# Patient Record
(demographics unavailable — no encounter records)

---

## 2025-01-24 NOTE — HEALTH RISK ASSESSMENT
[Good] : ~his/her~  mood as  good [Yes] : Yes [2 - 4 times a month (2 pts)] : 2-4 times a month (2 points) [1 or 2 (0 pts)] : 1 or 2 (0 points) [Never (0 pts)] : Never (0 points) [No] : In the past 12 months have you used drugs other than those required for medical reasons? No [PHQ-2 Negative - No further assessment needed] : PHQ-2 Negative - No further assessment needed [Never] : Never [None] : None [With Family] : lives with family [Graduate School] : graduate school [Sexually Active] : sexually active [Feels Safe at Home] : Feels safe at home [Fully functional (bathing, dressing, toileting, transferring, walking, feeding)] : Fully functional (bathing, dressing, toileting, transferring, walking, feeding) [Fully functional (using the telephone, shopping, preparing meals, housekeeping, doing laundry, using] : Fully functional and needs no help or supervision to perform IADLs (using the telephone, shopping, preparing meals, housekeeping, doing laundry, using transportation, managing medications and managing finances) [Reports normal functional visual acuity (ie: able to read med bottle)] : Reports normal functional visual acuity [Smoke Detector] : smoke detector [Carbon Monoxide Detector] : carbon monoxide detector [Seat Belt] :  uses seat belt [Sunscreen] : uses sunscreen [de-identified] : 4x/wk delfina cheng [de-identified] : balanced no restrictions [Change in mental status noted] : No change in mental status noted [Language] : denies difficulty with language [Behavior] : denies difficulty with behavior [Learning/Retaining New Information] : denies difficulty learning/retaining new information [Handling Complex Tasks] : denies difficulty handling complex tasks [Reasoning] : denies difficulty with reasoning [Spatial Ability and Orientation] : denies difficulty with spatial ability and orientation [High Risk Behavior] : no high risk behavior [Reports changes in hearing] : Reports no changes in hearing [Reports changes in vision] : Reports no changes in vision [FreeTextEntry2] :

## 2025-01-24 NOTE — HISTORY OF PRESENT ILLNESS
[FreeTextEntry1] : establish care [de-identified] : 35M presents to establish care. would like to lose weight. active lifestyle delfina baez regularly. ongoing w/u with uro for hypogonadism.

## 2025-01-24 NOTE — HISTORY OF PRESENT ILLNESS
[FreeTextEntry1] : establish care [de-identified] : 35M presents to establish care. would like to lose weight. active lifestyle delfina baez regularly. ongoing w/u with uro for hypogonadism.

## 2025-01-24 NOTE — HEALTH RISK ASSESSMENT
[Good] : ~his/her~  mood as  good [Yes] : Yes [2 - 4 times a month (2 pts)] : 2-4 times a month (2 points) [1 or 2 (0 pts)] : 1 or 2 (0 points) [Never (0 pts)] : Never (0 points) [No] : In the past 12 months have you used drugs other than those required for medical reasons? No [PHQ-2 Negative - No further assessment needed] : PHQ-2 Negative - No further assessment needed [Never] : Never [None] : None [With Family] : lives with family [Graduate School] : graduate school [Sexually Active] : sexually active [Feels Safe at Home] : Feels safe at home [Fully functional (bathing, dressing, toileting, transferring, walking, feeding)] : Fully functional (bathing, dressing, toileting, transferring, walking, feeding) [Fully functional (using the telephone, shopping, preparing meals, housekeeping, doing laundry, using] : Fully functional and needs no help or supervision to perform IADLs (using the telephone, shopping, preparing meals, housekeeping, doing laundry, using transportation, managing medications and managing finances) [Reports normal functional visual acuity (ie: able to read med bottle)] : Reports normal functional visual acuity [Smoke Detector] : smoke detector [Carbon Monoxide Detector] : carbon monoxide detector [Seat Belt] :  uses seat belt [Sunscreen] : uses sunscreen [de-identified] : 4x/wk delfina cheng [de-identified] : balanced no restrictions [Change in mental status noted] : No change in mental status noted [Language] : denies difficulty with language [Behavior] : denies difficulty with behavior [Learning/Retaining New Information] : denies difficulty learning/retaining new information [Handling Complex Tasks] : denies difficulty handling complex tasks [Reasoning] : denies difficulty with reasoning [Spatial Ability and Orientation] : denies difficulty with spatial ability and orientation [High Risk Behavior] : no high risk behavior [Reports changes in hearing] : Reports no changes in hearing [Reports changes in vision] : Reports no changes in vision [FreeTextEntry2] :

## 2025-01-24 NOTE — ASSESSMENT
[FreeTextEntry1] : hypogonad - f/u LH, FSH, testosterone - f/u uro  Obesity - LSM discussed  HCM Educated about importance of healthy diet, physical activity (45 min 5 days a week moderate intensity exercises), proper sleep (8 hours of sleep), importance of screening test for early detection and treatment of cancer. Importance of immunizations including COVID-19 and seasonal flu vaccine in order to prevent or lessen disease.

## 2025-01-28 NOTE — HISTORY OF PRESENT ILLNESS
[FreeTextEntry1] : 01/23/2025 cc new patient vist Pt is a 35 year old male presenting for a new patient visit.   Wife 37F, works as a  at [employer] Attempted IVF X1 (retrieved 2 eggs), doing second round currently Follows with [facility] (Dr. Ac Bowers in the past, planned to see Dr. Ashley Ashford) Neither patient nor partner have children Wife has been checked out - initially she was told "tubes are blocked" but states it was a misdiagnosis Patient had SA X1 a few months ago (does not have it on him) but states he had oligospermia, abnormal morphology Lots of colds, but not recent febrile illnesses. Lots of biking, instances of numbness to penis/scrotum. No issues with erection or ejaculating. No issues with urination. Good libido, good energy. Has never had his T checked. Reports his testicles have always "hung lower" than his friends Him and partner want at least 1 child.  PMH: neck injury (has a "floating bone") PSH: denies Meds: natural sleeping aids FH: aunt - breast cancer SH: occasional EtOH, no MJ use or recreational drug use; works as a

## 2025-01-28 NOTE — ASSESSMENT
[FreeTextEntry1] : Pt is a 35 year old male with male infertility.  Informed pt that blood work and semen analysis must be gathered before continuing with treatment plan.   Complete Semen Analysis ordered today

## 2025-01-28 NOTE — HISTORY OF PRESENT ILLNESS
[FreeTextEntry1] : 01/23/2025 cc new patient vist Pt is a 35 year old male presenting for a new patient visit.   Wife 37F, works as a  at [employer] Attempted IVF X1 (retrieved 2 eggs), doing second round currently Follows with [facility] (Dr. Ac Bowers in the past, planned to see Dr. Ashley Asfhord) Neither patient nor partner have children Wife has been checked out - initially she was told "tubes are blocked" but states it was a misdiagnosis Patient had SA X1 a few months ago (does not have it on him) but states he had oligospermia, abnormal morphology Lots of colds, but not recent febrile illnesses. Lots of biking, instances of numbness to penis/scrotum. No issues with erection or ejaculating. No issues with urination. Good libido, good energy. Has never had his T checked. Reports his testicles have always "hung lower" than his friends Him and partner want at least 1 child.  PMH: neck injury (has a "floating bone") PSH: denies Meds: natural sleeping aids FH: aunt - breast cancer SH: occasional EtOH, no MJ use or recreational drug use; works as a

## 2025-01-28 NOTE — END OF VISIT
[FreeTextEntry4] : This note was written by Mala Gill on 01/23/2025 actively solely Manpreet Quinonez M.D. I, Mala Gill, am scribing for and in the presence of Manpreet Quinonez M.D. in the following sections HISTORY OF PRESENT ILLNESS, PAST MEDICAL/FAMILY/SOCIAL HISTORY; REVIEW OF SYSTEMS; VITAL SIGNS; PHYSICAL EXAM; ASSESSMENT/PLAN. All medical record entries made by this scribe at , Manpreet Quinonez M.D. direction and personally dictated by me on 01/23/2025. I personally performed the services described in the documentation, reviewed the documentation recorded by the scribe in my presence, and it accurately and completely records my words and actions.

## 2025-02-25 NOTE — ASSESSMENT
APNP [facility]ski's and Dr. Grigsby's Team,     GISELE Per chart review Karishma was seen at [facility] ER on 12/16 for a cough with new sputum, chest discomfort. ER started her on  PRN gabapentin. I was unable to reach her telephonically this week.     Please contact Karishma if either team wants to see her for a post ER visit.     Thank you,   PRATIK Jacobs [phone]   [facility]    [FreeTextEntry1] : ADY GUZMAN is a 35-year-old male with right shoulder pain and right knee pain. I discussed with the patient that their symptoms, signs, and imaging are most consistent with right shoulder labral tear and left knee MCL sprain.  We reviewed the natural history of this condition and treatment options ranging from conservative measures (activity modification, physical therapy, icing, oral anti-inflammatory and/or analgesic medications, steroid injection) to surgical management. We agreed on the following plan:    XRs and MRIs reviewed with the patient today. Encouraged to continue home exercises per handout. Continue physical therapy. New referral provided. We had a lengthy discussion about surgical options for shoulder labral tear. Patient is advised to continue conservative management given minimal pain symptoms, but understanding the risks involved with combat sports and shoulder instability. Patient to consider surgery should pain worsen, range of motion limits, or his shoulder becomes recurrently unstable. Follow up as needed.

## 2025-02-25 NOTE — ASSESSMENT
[FreeTextEntry1] : ADY GUZMAN is a 35-year-old male with right shoulder pain and right knee pain. I discussed with the patient that their symptoms, signs, and imaging are most consistent with right shoulder labral tear and left knee MCL sprain.  We reviewed the natural history of this condition and treatment options ranging from conservative measures (activity modification, physical therapy, icing, oral anti-inflammatory and/or analgesic medications, steroid injection) to surgical management. We agreed on the following plan:    XRs and MRIs reviewed with the patient today. Encouraged to continue home exercises per handout. Continue physical therapy. New referral provided. We had a lengthy discussion about surgical options for shoulder labral tear. Patient is advised to continue conservative management given minimal pain symptoms, but understanding the risks involved with combat sports and shoulder instability. Patient to consider surgery should pain worsen, range of motion limits, or his shoulder becomes recurrently unstable. Follow up as needed.

## 2025-02-25 NOTE — HISTORY OF PRESENT ILLNESS
[de-identified] : ADY GUZMAN is a 35-year-old male with right shoulder pain. He comes in for a second opinion. The pain started in November 2024 while wrestling, he injured his neck which subsequently affected his shoulders. He states that when it happened they were "gentle practicing". RHD. He did 6 weeks of PT. The initial shoulder injury occurred in 2011, involving a complete dislocation that self-reduced. Patient wants to know if he needs surgery. The current symptoms include weakness, aching pain, cracking, and clicking in the shoulder. Movements such as rapid pulling, yanking, and reaching behind the back cause discomfort. The patient experiences numbness in the fifth and fourth fingers, which may be positional or related to the neck. No injections have been received in the shoulder or neck. The patient has a high pain threshold and does not experience significant pain during physical activities. The patient is a  and sometimes wrestles competitively.

## 2025-02-25 NOTE — PHYSICAL EXAM
[de-identified] : General: Well-nourished, well-developed, alert, and in no acute distress.  Head: Normocephalic.  Eyes: Pupils equal, extraocular muscles intact, normal sclera.  Nose: No nasal discharge.  Cardiovascular: Extremities are warm and well-perfused. Distal pulses are symmetric bilaterally.  Respiratory: No labored breathing.  Extremities: Sensation is intact distally bilaterally. Distal pulses are symmetric bilaterally  Lymphatic: No regional lymphadenopathy, no lymphedema  Neurologic: No focal deficits  Skin: Normal skin color, texture, and turgor  Psychiatric: Normal affect  MSK: C-spine demonstrates no tenderness to palpation midline, paraspinals. AROM: full, pain with lateral flexion bilaterally Cervical facet loading: negative Spurling's Compression: negative   Examination of right shoulder shows no overlying skin changes or muscular atrophy. There is no tenderness to palpation over the AC joint, Bicipital groove, Trapezius Range of motion shows forward elevation of 140, abduction 140, external rotation 60, and internal rotation to the lumbar spine. There is no pain at the end range of motion.   Special Tests: Neer's negative Hawkin's negative Ashleigh's negative Resisted ext rotation negative Lift off negative Sunflower positive Speed's negative Apprehension test positive No scapular winging.   Examination of left shoulder shows no overlying skin changes or muscular atrophy. There is no tenderness to palpation over the AC joint, Bicipital groove, Trapezius   Range of motion shows forward elevation of 180, abduction 180, external rotation 60, and internal rotation to the mid thoracic spine. There is no pain at the end range of motion.   Special Tests: Neer's negative Hawkin's negative Ashleigh's negative Resisted ext rotation negative Lift off negative Sunflower negative Speed's negative Apprehension test negative No scapular winging.     Sensation is intact to light touch over the axillary, musculocutaneous, median, radial, and ulnar nerve distributions bilaterally. Capillary refill is less than two seconds. Radial pulses 2+ equal bilaterally. Strength testing shows 5/5 abduction, 5/5 external rotation, 5/5 internal rotation, 5/5 biceps, 5/5 triceps. 5/5 wrist extension, 5/5 intrinsics. Reflexes 2+ biceps, brachioradialis.   Examination of [right] knee:   Gait [non-antalgic] No pain with double leg squat  No effusion No erythema, hematoma or skin lesion Nontender to palpation: medial joint line, lateral joint line, medial patellar facet, lateral patellar facet, quad tendon, patellar tendon, pes, Gerdy's tubercle, tibial tuberosity, popliteal fossa, hamstrings, ITB No warmth No Baker's cyst palpable ROM: 0-[120] [No] patellar crepitus   Log roll negative Lachman negative Anterior drawer negative Posterior drawer negative Varus/valgus stress negative at 0 and 30 deg Clayton negative Thessaly negative    [de-identified] :  MRI RIGHT SHOULDER WITHOUT CONTRAST obtained at [facility] on 2024 was reviewed with the patient demonstratin.  Focal low-grade interstitial tear of the anterior supraspinatus footprint. Mild/moderate rotator cuff tendinosis. 2.  Superior, anterior and inferior labral tear with a multilobulated paralabral cyst anteroinferiorly. 3.  Mild acromioclavicular joint arthrosis. Subacromial spur and mild thickening of the coracoacromial ligament. 4.  Mild subacromial/subdeltoid bursitis.  X-RAY RIGHT SHOULDER MINIMUM 2 VIEWS obtained at [facility] on 2024 was reviewed with the patient demonstrating: No fracture or dislocation. Normal glenohumeral joint. Mild acromioclavicular joint arthrosis. Subacromial spur.  Unremarkable soft tissue structures. Visualized right hemithorax is grossly clear.

## 2025-02-25 NOTE — DISCUSSION/SUMMARY

## 2025-02-25 NOTE — HISTORY OF PRESENT ILLNESS
[de-identified] : ADY GUZMAN is a 35-year-old male with right shoulder pain. He comes in for a second opinion. The pain started in November 2024 while wrestling, he injured his neck which subsequently affected his shoulders. He states that when it happened they were "gentle practicing". RHD. He did 6 weeks of PT. The initial shoulder injury occurred in 2011, involving a complete dislocation that self-reduced. Patient wants to know if he needs surgery. The current symptoms include weakness, aching pain, cracking, and clicking in the shoulder. Movements such as rapid pulling, yanking, and reaching behind the back cause discomfort. The patient experiences numbness in the fifth and fourth fingers, which may be positional or related to the neck. No injections have been received in the shoulder or neck. The patient has a high pain threshold and does not experience significant pain during physical activities. The patient is a  and sometimes wrestles competitively.

## 2025-02-25 NOTE — PHYSICAL EXAM
[de-identified] : General: Well-nourished, well-developed, alert, and in no acute distress.  Head: Normocephalic.  Eyes: Pupils equal, extraocular muscles intact, normal sclera.  Nose: No nasal discharge.  Cardiovascular: Extremities are warm and well-perfused. Distal pulses are symmetric bilaterally.  Respiratory: No labored breathing.  Extremities: Sensation is intact distally bilaterally. Distal pulses are symmetric bilaterally  Lymphatic: No regional lymphadenopathy, no lymphedema  Neurologic: No focal deficits  Skin: Normal skin color, texture, and turgor  Psychiatric: Normal affect  MSK: C-spine demonstrates no tenderness to palpation midline, paraspinals. AROM: full, pain with lateral flexion bilaterally Cervical facet loading: negative Spurling's Compression: negative   Examination of right shoulder shows no overlying skin changes or muscular atrophy. There is no tenderness to palpation over the AC joint, Bicipital groove, Trapezius Range of motion shows forward elevation of 140, abduction 140, external rotation 60, and internal rotation to the lumbar spine. There is no pain at the end range of motion.   Special Tests: Neer's negative Hawkin's negative Ashleigh's negative Resisted ext rotation negative Lift off negative Macoupin positive Speed's negative Apprehension test positive No scapular winging.   Examination of left shoulder shows no overlying skin changes or muscular atrophy. There is no tenderness to palpation over the AC joint, Bicipital groove, Trapezius   Range of motion shows forward elevation of 180, abduction 180, external rotation 60, and internal rotation to the mid thoracic spine. There is no pain at the end range of motion.   Special Tests: Neer's negative Hawkin's negative Ashleigh's negative Resisted ext rotation negative Lift off negative Macoupin negative Speed's negative Apprehension test negative No scapular winging.     Sensation is intact to light touch over the axillary, musculocutaneous, median, radial, and ulnar nerve distributions bilaterally. Capillary refill is less than two seconds. Radial pulses 2+ equal bilaterally. Strength testing shows 5/5 abduction, 5/5 external rotation, 5/5 internal rotation, 5/5 biceps, 5/5 triceps. 5/5 wrist extension, 5/5 intrinsics. Reflexes 2+ biceps, brachioradialis.   Examination of [right] knee:   Gait [non-antalgic] No pain with double leg squat  No effusion No erythema, hematoma or skin lesion Nontender to palpation: medial joint line, lateral joint line, medial patellar facet, lateral patellar facet, quad tendon, patellar tendon, pes, Gerdy's tubercle, tibial tuberosity, popliteal fossa, hamstrings, ITB No warmth No Baker's cyst palpable ROM: 0-[120] [No] patellar crepitus   Log roll negative Lachman negative Anterior drawer negative Posterior drawer negative Varus/valgus stress negative at 0 and 30 deg Clayton negative Thessaly negative    [de-identified] :  MRI RIGHT SHOULDER WITHOUT CONTRAST obtained at [facility] on 2024 was reviewed with the patient demonstratin.  Focal low-grade interstitial tear of the anterior supraspinatus footprint. Mild/moderate rotator cuff tendinosis. 2.  Superior, anterior and inferior labral tear with a multilobulated paralabral cyst anteroinferiorly. 3.  Mild acromioclavicular joint arthrosis. Subacromial spur and mild thickening of the coracoacromial ligament. 4.  Mild subacromial/subdeltoid bursitis.  X-RAY RIGHT SHOULDER MINIMUM 2 VIEWS obtained at [facility] on 2024 was reviewed with the patient demonstrating: No fracture or dislocation. Normal glenohumeral joint. Mild acromioclavicular joint arthrosis. Subacromial spur.  Unremarkable soft tissue structures. Visualized right hemithorax is grossly clear.

## 2025-02-25 NOTE — END OF VISIT
[FreeTextEntry3] : Documented by Anastasiya Ray acting as a scribe for Lula Sandhu on 02/21/2025   All medical record entries made by the Scribe were at my, Dr. Lula Sandhu direction and personally dictated by me on 02/21/2025 . I have reviewed the chart and agree that the record accurately reflects my personal performance of the history, physical exam, assessment and plan. I have also personally directed, reviewed, and agreed with the chart. [Time Spent: ___ minutes] : I have spent [unfilled] minutes of time on the encounter which excludes teaching and separately reported services.

## 2025-02-25 NOTE — DISCUSSION/SUMMARY

## 2025-02-25 NOTE — ADDENDUM
[FreeTextEntry1] : I, Anastasiya Ray ([facility]) assisted in filling out this chart under the dictation of Lula Sandhu on 02/21/2025 .

## 2025-02-25 NOTE — ADDENDUM
[FreeTextEntry1] : I, Anastasiya Ray ([facility]) assisted in filling out this chart under the dictation of Lula Sandhu on 02/21/2025 .

## 2025-04-21 NOTE — HISTORY OF PRESENT ILLNESS
[de-identified] : His wife is concerned about his hearing; he's had a few audiograms most recently ~5 yrs ago at which time he was told that he has HFHL. Rare tinnitus. Denies: ear pain, drainage, frequent loud noise exp/shooting, frequent infections, hx of ear surgery or chemo; denies a FHx of hearing loss. Hx of IV Vanc in ~2015 for a staph infxn of his R arm. Qtip use: yes For several years he's had strongly seasonal allergies for which he uses antihistamines or prn Flonase. Nevertheless he has a chronically blocked nose.

## 2025-04-21 NOTE — PROCEDURE
[de-identified] : Indication: requirement for exam not possible via anterior rhinoscopy; chronic nasal obstruction After verbal consent and the administration of an aerosolized oxymetazoline/lidocaine mix, examination was performed with a flexible endoscope attached to a video monitoring system. Findings: Septum: L NSD Mucosa: normal Polyposis: not present Inferior turbinates: large, pos Afrin test Middle and superior turbinates: normal Inferior meatus: unremarkable Middle meatus: unremarkable Superior meatus: unremarkable Speno-ethmoidal recess: unremarkable Nasopharynx: unremarkable Secretions: unremarkable Other findings: none

## 2025-04-21 NOTE — ASSESSMENT
[FreeTextEntry1] : We discussed his hearing loss which may be genetic, a result of his IV abx or a combination of factors; HAE ordered as requested. RTC for an ear cleaning in 6 months; annual audios, sooner prn any changes.  Trial of fluticasone daily for a month & RTC for lack of effect. We discussed allergen mitigation and provided the patient with the corresponding educational handout; reviewed proper nasal steroid administration technique.

## 2025-04-21 NOTE — PHYSICAL EXAM
[Binocular Microscopic Exam] : Binocular microscopic exam was performed [Nasal Endoscopy Performed] : nasal endoscopy was performed, see procedure section for findings [] : septum deviated to the left [Normal] : no masses and lesions seen, face is symmetric [FreeTextEntry8] : obstructing cerumen removed with suction [FreeTextEntry9] : obstructing cerumen removed with suction [de-identified] :  Large inferior turbinates; positive Afrin test

## 2025-07-06 NOTE — PHYSICAL EXAM
[de-identified] : General: No acute distress, conversant, well-nourished. Head: Normocephalic, atraumatic Neck: trachea midline, FROM Heart: normotensive and normal rate and rhythm Lungs: No labored breathing Skin: No abrasions, no rashes, no edema Psych: Alert and oriented to person, place and time Extremities: no peripheral edema or digital cyanosis Gait: Normal gait. Can perform tandem gait.   Vascular: warm and well perfused distally, palpable distal pulses  MSK: Lumbar spine: No tenderness to palpation.  No step-off, no deformity.  NEURO EXAM: Sensation  Left L2  -  2/2             Left L3  -  2/2 Left L4  -  2/2 Left L5  -  2/2 Left S1  -  2/2  Right L2  -  2/2             Right L3  -  2/2 Right L4  -  2/2 Right L5  -  2/2 Right S1  -  2/2  Motor:  Left L2 (hip flexion)                            5/5                 Left L3 (knee extension)                   5/5                 Left L4 (ankle dorsiflexion)                 5/5                 Left L5 (long toe extensor)                5/5                 Left S1 (ankle plantar flexion)           5/5  Right L2 (hip flexion)                            5/5                 Right L3 (knee extension)                   5/5                 Right L4 (ankle dorsiflexion)                 5/5                 Right L5 (long toe extensor)                5/5                 Right S1 (ankle plantar flexion)           5/5  Reflexes: Normal and symmetric Negative clonus.  Down-going Babinski.     [de-identified] : I ordered radiographs to evaluate the patient's symptoms. Lumbar 4 view radiographs taken in the office today show no dislocation or fracture.  Lumbar spondylosis.  No instability on dynamic series.

## 2025-07-06 NOTE — HISTORY OF PRESENT ILLNESS
[de-identified] : 35 year old male presents with acute exacerbation of chronic low back pain.  The pain radiates to his legs.  He reports numbness in the feet.  He denies recent illness, fevers, weakness, balance problems, saddle anesthesia, urinary retention or fecal incontinence. Activity makes it worse.

## 2025-07-06 NOTE — PHYSICAL EXAM
[de-identified] : General: No acute distress, conversant, well-nourished. Head: Normocephalic, atraumatic Neck: trachea midline, FROM Heart: normotensive and normal rate and rhythm Lungs: No labored breathing Skin: No abrasions, no rashes, no edema Psych: Alert and oriented to person, place and time Extremities: no peripheral edema or digital cyanosis Gait: Normal gait. Can perform tandem gait.   Vascular: warm and well perfused distally, palpable distal pulses  MSK: Lumbar spine: No tenderness to palpation.  No step-off, no deformity.  NEURO EXAM: Sensation  Left L2  -  2/2             Left L3  -  2/2 Left L4  -  2/2 Left L5  -  2/2 Left S1  -  2/2  Right L2  -  2/2             Right L3  -  2/2 Right L4  -  2/2 Right L5  -  2/2 Right S1  -  2/2  Motor:  Left L2 (hip flexion)                            5/5                 Left L3 (knee extension)                   5/5                 Left L4 (ankle dorsiflexion)                 5/5                 Left L5 (long toe extensor)                5/5                 Left S1 (ankle plantar flexion)           5/5  Right L2 (hip flexion)                            5/5                 Right L3 (knee extension)                   5/5                 Right L4 (ankle dorsiflexion)                 5/5                 Right L5 (long toe extensor)                5/5                 Right S1 (ankle plantar flexion)           5/5  Reflexes: Normal and symmetric Negative clonus.  Down-going Babinski.     [de-identified] : I ordered radiographs to evaluate the patient's symptoms. Lumbar 4 view radiographs taken in the office today show no dislocation or fracture.  Lumbar spondylosis.  No instability on dynamic series.

## 2025-07-06 NOTE — HISTORY OF PRESENT ILLNESS
[de-identified] : 35 year old male presents with acute exacerbation of chronic low back pain.  The pain radiates to his legs.  He reports numbness in the feet.  He denies recent illness, fevers, weakness, balance problems, saddle anesthesia, urinary retention or fecal incontinence. Activity makes it worse.

## 2025-07-06 NOTE — ASSESSMENT
[FreeTextEntry1] : 35 year old male presents with acute exacerbation of chronic low back pain.  The pain radiates to his legs.  He reports numbness in the feet.  He denies recent illness, fevers, weakness, balance problems, saddle anesthesia, urinary retention or fecal incontinence. Activity makes it worse.  The patient was given a referral for physical therapy.  F/U 6 weeks. We discussed red flag symptoms that would require emergent evaluation. He knows to call with any questions or concerns or if his symptoms acutely worsen.

## 2025-07-30 NOTE — PHYSICAL EXAM
[de-identified] : Lumbar Physical Exam    Inspection: No evidence of scoliosis.    Palpation:   Tenderness: Mild Location: Right lumbar paraspinal    ROM Flexion: normal   Extension: normal  Rotation: normal  Lateral Bending: normal     Straight leg test: Positive on the left Slump test: Equivocal  Sensation: Normal to light touch   Motor: 5/5 strength throughout L2-S1  Deep tendon reflexes are symmetrical   Clonus: Negative Babinski: Negative

## 2025-07-30 NOTE — DISCUSSION/SUMMARY
[de-identified] : ADY GUZMAN is a 35 year old male presenting with Acute on chronic low back pain with left-sided radicular symptoms consistent with chronic radiculopathy.  Plan: 1.  Referral given for updated MRI of the lumbar spine 2.  Referral given to pain management for further evaluation given patient has failed multiple rounds of physical therapy and medications.